# Patient Record
Sex: FEMALE | Race: WHITE | NOT HISPANIC OR LATINO | Employment: FULL TIME | URBAN - METROPOLITAN AREA
[De-identification: names, ages, dates, MRNs, and addresses within clinical notes are randomized per-mention and may not be internally consistent; named-entity substitution may affect disease eponyms.]

---

## 2018-06-22 DIAGNOSIS — J45.909 EXTRINSIC ASTHMA WITHOUT COMPLICATION, UNSPECIFIED ASTHMA SEVERITY, UNSPECIFIED WHETHER PERSISTENT: ICD-10-CM

## 2018-06-27 ENCOUNTER — TELEPHONE (OUTPATIENT)
Dept: PULMONOLOGY | Facility: HOSPICE | Age: 58
End: 2018-06-27

## 2018-06-27 ENCOUNTER — NON-PROVIDER VISIT (OUTPATIENT)
Dept: PULMONOLOGY | Facility: HOSPICE | Age: 58
End: 2018-06-27
Payer: COMMERCIAL

## 2018-06-27 ENCOUNTER — APPOINTMENT (OUTPATIENT)
Dept: RADIOLOGY | Facility: IMAGING CENTER | Age: 58
End: 2018-06-27
Attending: INTERNAL MEDICINE
Payer: COMMERCIAL

## 2018-06-27 ENCOUNTER — OFFICE VISIT (OUTPATIENT)
Dept: PULMONOLOGY | Facility: HOSPICE | Age: 58
End: 2018-06-27
Payer: COMMERCIAL

## 2018-06-27 VITALS
HEART RATE: 79 BPM | BODY MASS INDEX: 26.98 KG/M2 | RESPIRATION RATE: 16 BRPM | DIASTOLIC BLOOD PRESSURE: 70 MMHG | OXYGEN SATURATION: 98 % | SYSTOLIC BLOOD PRESSURE: 110 MMHG | HEIGHT: 64 IN | WEIGHT: 158 LBS

## 2018-06-27 VITALS — WEIGHT: 158 LBS | HEIGHT: 64 IN | BODY MASS INDEX: 26.98 KG/M2

## 2018-06-27 DIAGNOSIS — J45.909 EXTRINSIC ASTHMA WITHOUT COMPLICATION, UNSPECIFIED ASTHMA SEVERITY, UNSPECIFIED WHETHER PERSISTENT: ICD-10-CM

## 2018-06-27 DIAGNOSIS — J45.909 UNCOMPLICATED ASTHMA, UNSPECIFIED ASTHMA SEVERITY, UNSPECIFIED WHETHER PERSISTENT: ICD-10-CM

## 2018-06-27 PROCEDURE — 99204 OFFICE O/P NEW MOD 45 MIN: CPT | Mod: 25 | Performed by: INTERNAL MEDICINE

## 2018-06-27 PROCEDURE — 71046 X-RAY EXAM CHEST 2 VIEWS: CPT | Mod: TC,FY | Performed by: INTERNAL MEDICINE

## 2018-06-27 PROCEDURE — 94060 EVALUATION OF WHEEZING: CPT | Performed by: INTERNAL MEDICINE

## 2018-06-27 PROCEDURE — 94726 PLETHYSMOGRAPHY LUNG VOLUMES: CPT | Performed by: INTERNAL MEDICINE

## 2018-06-27 PROCEDURE — 94729 DIFFUSING CAPACITY: CPT | Performed by: INTERNAL MEDICINE

## 2018-06-27 RX ORDER — ALBUTEROL SULFATE 90 UG/1
AEROSOL, METERED RESPIRATORY (INHALATION)
COMMUNITY
Start: 2015-01-08

## 2018-06-27 RX ORDER — MONTELUKAST SODIUM 10 MG/1
10 TABLET ORAL DAILY
COMMUNITY

## 2018-06-27 RX ORDER — CHLORAL HYDRATE 500 MG
CAPSULE ORAL
COMMUNITY

## 2018-06-27 RX ORDER — BUDESONIDE AND FORMOTEROL FUMARATE DIHYDRATE 80; 4.5 UG/1; UG/1
2 AEROSOL RESPIRATORY (INHALATION) 2 TIMES DAILY
COMMUNITY

## 2018-06-27 ASSESSMENT — PULMONARY FUNCTION TESTS
FEV1/FVC_PERCENT_PREDICTED: 86
FEV1/FVC_PERCENT_LLN: 65
FEV1_PREDICTED: 2.6
FEV1/FVC: 69
FEV1: 1.77
FEV1_PERCENT_PREDICTED: 67
FEV1/FVC: 69.41
FEV1/FVC_PERCENT_PREDICTED: 78
FEV1/FVC_PERCENT_CHANGE: 2
FEV1/FVC: 67
FEV1/FVC_PERCENT_PREDICTED: 88
FEV1_LLN: 2.17
FVC: 2.39
FEV1_PERCENT_PREDICTED: 62
FEV1_PERCENT_CHANGE: 9
FEV1/FVC_PERCENT_PREDICTED: 88
FEV1/FVC_PREDICTED: 78
FVC_PERCENT_PREDICTED: 76
FVC_PREDICTED: 3.35
FEV1/FVC: 68
FVC: 2.55
FVC_LLN: 2.80
FEV1/FVC_PERCENT_PREDICTED: 87
FEV1/FVC_PERCENT_CHANGE: 150
FEV1: 1.62
FVC_PERCENT_PREDICTED: 71
FEV1_PERCENT_CHANGE: 6

## 2018-06-27 NOTE — PROGRESS NOTES
"CC:  Chief Complaint   Patient presents with   • Results     PFT   • Asthma     The patient came today for the first time for evaluation of chronic cough, postnasal drips.    HPI:   The patient is a 58 y.o. female with history of chronic cough started a few years ago, postnasal drips and constant clearing of her throat came today for the first time as a referral from his primary care physician office. The patient has this cough after \"pneumonia\" happened about 6 years ago. The cough sometimes is productive. Clear white sputum. She was prescribed Symbicort 80/4.5 and then Qvar added to her regimen with some improvement of her symptoms. She is scheduled to be seen in the allergy clinic for her stuffy nose and postnasal drips. She uses Flonase nasal spray and saline nasal washing. She denies any shortness breath, no fever. No weight loss no hemoptysis.    Pulmonary function test done today showed moderate obstructive lung disease with normal diffusion capacity and borderline response to bronchodilators.  ROS:   Constitutional: Denies fevers, chills, night sweats  Eyes: Denies vision loss, pain, drainage, double vision  Ears, Nose, Throat: Denies earache, difficulty hearing, tinnitus, nasal congestion, hoarseness  Cardiovascular: Denies chest pain, tightness, palpitations, orthopnea or edema  Respiratory: Please see history of present illness  GI: Denies heartburn, dysphagia, nausea, abdominal pain, diarrhea or constipation  : Denies frequent urination, hematuria, discharge or painful urination  Musculoskeletal: Denies back pain, painful joints, sore muscles  Neurological: Denies weakness or headaches  Skin: No rashes  All other ROS were negative except what mentioned in the HPI     Past Medical History:  Past Medical History:   Diagnosis Date   • Allergic rhinitis    • Chickenpox    • [place] measles    • Mumps    • Nasal drainage    • Pneumonia                Social History:  Social History     Social History   • " "Marital status:      Spouse name: N/A   • Number of children: N/A   • Years of education: N/A     Occupational History   • Not on file.     Social History Main Topics   • Smoking status: Former Smoker     Packs/day: 0.25     Years: 15.00     Types: Cigarettes     Quit date: 1993   • Smokeless tobacco: Never Used   • Alcohol use Yes      Comment: Occ   • Drug use: No   • Sexual activity: Not on file     Other Topics Concern   • Not on file     Social History Narrative   • No narrative on file             Family History:  Family History   Problem Relation Age of Onset   • Heart Failure Mother    • Alzheimer's Disease Father    • Diabetes Sister        No current outpatient prescriptions on file prior to visit.     No current facility-administered medications on file prior to visit.        Allergies:   Penicillins        Vitals:    06/27/18 0844   Height: 1.626 m (5' 4\")   Weight: 71.7 kg (158 lb)   Weight % change since last entry.: 0 %   BP: 110/70   Pulse: 79   BMI (Calculated): 27.12   Resp: 16           Physical Exam:  Appearance:  in no acute distress  HEENT: Normocephalic, atraumatic, white sclera, PERRLA, oropharynx clear  Neck: No adenopathy or masses  Respiratory: no intercostal retractions or accessory muscle use  Lungs auscultation: Clear to auscultation bilaterally  Cardiovascular: Regular rate rhythm. No murmurs, rubs or gallops.  No LE edema  Abdomen: soft, nondistended  Gait: Normal  Digits: No clubbing, cyanosis  Motor: No focal deficits  Orientation: Oriented to time, person and place      DATA:    Labs:  No results found for: WBC, RBC, HEMOGLOBIN, HEMATOCRIT, MCV, MCH, MCHC, MPV, NEUTSPOLYS, LYMPHOCYTES, MONOCYTES, EOSINOPHILS, BASOPHILS, HYPOCHROMIA, ANISOCYTOSIS   No results found for: SODIUM, POTASSIUM, CHLORIDE, CO2, GLUCOSE, BUN, CREATININE, BUNCREATRAT, GLOMRATE     Imaging:  Chest x-ray done in our clinic today was personally reviewed does not show any active lung disease as per my " reading    PULMONARY FUNCTION TEST:  Pulmonary function test done today showed FEV1 of 67% of predicted with borderline improvement bronchodilators and FEV1/FVC ratio was 69% diffusion capacity was  133% predicted        Diagnosis:  1. Uncomplicated asthma, unspecified asthma severity, unspecified whether persistent  DX-CHEST-2 VIEWS    Fluticasone Furoate-Vilanterol (BREO ELLIPTA) 200-25 MCG/INH AEROSOL POWDER, BREATH ACTIVATED    DX-CHEST-2 VIEWS    DISCONTINUED: Fluticasone Furoate-Vilanterol (BREO ELLIPTA) 200-25 MCG/INH AEROSOL POWDER, BREATH ACTIVATED        Assessment and Plan   The patient Was probably secondary to reactive airway disease like asthma and component of postnasal drip secondary to allergic rhinitis. She has remote history of smoking component of COPD he is also not excluded. She improved when steroid inhaler dose was increased. I am going to switch the patient to Breo 200. Hopefully this would continue to improve her symptoms. She will continue Flonase nasal spray and saline nasal washings. Is scheduled to be seen in the allergy clinic for her nasal allergies. She thinks the steroid nasal spray is also help with the postnasal drips.        Chest x-ray showed ill-defined opacity in the right lower lobe as per the official radiologist's reading. I am going to repeat chest x-ray when the patient come back in 2 months                    Return in about 8 weeks (around 8/22/2018).        This note was created using voice recognition software. I apologize for any overlooked obvious grammar or  vocabulary mistake

## 2018-06-27 NOTE — TELEPHONE ENCOUNTER
Pt states that she was seen to day by you and wanted to inform you that the rx Breo isn't covered by her insurance company and is over $300 at pharmacy.  Pt is requesting the alternative to the rx.    Asthma

## 2018-06-28 NOTE — PROCEDURES
Technician: TEQUILA Kuo    Technician Comment:  Good patient effort & cooperation.  The results of this test meet the ATS/ERS standards for acceptability & reproducibility.  Test was performed on the Smart[employer] Body Plethysmograph-Elite DX system.  Predicted values were [facility]-3 for spirometry, [facility] for DLCO, ITS for Lung Volumes.  The DLCO was uncorrected for Hgb.  A bronchodilator of Ventolin HFA -2puffs via spacer administered.  DLCO performed during dilation period.    Interpretation:    SPIROMETRY:  1. FVC was 2.55L, 76% of predicted  2. FEV1 was 1.77 L, 67 % of predicted   3. FEV1/FVC ratio was 69%  4. There was borderline response to bronchodilators   5. Flow volume loop was consistent with obstruction     LUNG VOLUMES:  1. RV was  152% of predicted   2. TLC was 112 % of predicted       DIFFUSION CAPACITY:  1.Diffusion capacity was 133 % of predicted       IMPRESSION:   the patient has moderate obstructive lung disease, this is probably secondary to asthma vs COPD vs both. Clinical correlation is required

## 2018-07-10 ENCOUNTER — TELEPHONE (OUTPATIENT)
Dept: PULMONOLOGY | Facility: HOSPICE | Age: 58
End: 2018-07-10

## 2018-08-15 ENCOUNTER — APPOINTMENT (OUTPATIENT)
Dept: RADIOLOGY | Facility: IMAGING CENTER | Age: 58
End: 2018-08-15
Payer: COMMERCIAL

## 2018-08-15 ENCOUNTER — APPOINTMENT (OUTPATIENT)
Dept: PULMONOLOGY | Facility: HOSPICE | Age: 58
End: 2018-08-15
Payer: COMMERCIAL

## 2018-09-13 ENCOUNTER — HOSPITAL ENCOUNTER (OUTPATIENT)
Facility: MEDICAL CENTER | Age: 58
End: 2018-09-13
Payer: COMMERCIAL

## 2018-09-14 LAB
BDY FAT % MEASURED: 36.2 %
BP DIAS: 60 MMHG
BP SYS: 98 MMHG
DIABETES HTDIA: NO
EVENT NAME HTEVT: NORMAL
HYPERTENSION HTHYP: NO
SCREENING LOC CITY HTCIT: NORMAL
SCREENING LOC STATE HTSTA: NORMAL
SCREENING LOCATION HTLOC: NORMAL
SUBSCRIBER ID HTSID: NORMAL

## 2018-09-15 LAB
CHOLEST SERPL-MCNC: 241 MG/DL (ref 100–199)
FASTING STATUS PATIENT QL REPORTED: NORMAL
GLUCOSE SERPL-MCNC: 87 MG/DL (ref 65–99)
HDLC SERPL-MCNC: 64 MG/DL
LDLC SERPL CALC-MCNC: 161 MG/DL
TRIGL SERPL-MCNC: 79 MG/DL (ref 0–149)